# Patient Record
Sex: MALE | Race: WHITE | NOT HISPANIC OR LATINO | URBAN - METROPOLITAN AREA
[De-identification: names, ages, dates, MRNs, and addresses within clinical notes are randomized per-mention and may not be internally consistent; named-entity substitution may affect disease eponyms.]

---

## 2021-09-07 ENCOUNTER — EMERGENCY (EMERGENCY)
Facility: HOSPITAL | Age: 46
LOS: 0 days | Discharge: HOME | End: 2021-09-07
Attending: EMERGENCY MEDICINE | Admitting: EMERGENCY MEDICINE
Payer: COMMERCIAL

## 2021-09-07 VITALS
DIASTOLIC BLOOD PRESSURE: 86 MMHG | OXYGEN SATURATION: 99 % | RESPIRATION RATE: 18 BRPM | SYSTOLIC BLOOD PRESSURE: 130 MMHG | TEMPERATURE: 97 F | HEIGHT: 69 IN | HEART RATE: 55 BPM | WEIGHT: 214.95 LBS

## 2021-09-07 VITALS
HEART RATE: 72 BPM | OXYGEN SATURATION: 99 % | SYSTOLIC BLOOD PRESSURE: 133 MMHG | TEMPERATURE: 97 F | DIASTOLIC BLOOD PRESSURE: 76 MMHG | RESPIRATION RATE: 18 BRPM

## 2021-09-07 DIAGNOSIS — M79.662 PAIN IN LEFT LOWER LEG: ICD-10-CM

## 2021-09-07 DIAGNOSIS — R26.2 DIFFICULTY IN WALKING, NOT ELSEWHERE CLASSIFIED: ICD-10-CM
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
## 2021-09-07 DIAGNOSIS — S86.912A STRAIN OF UNSPECIFIED MUSCLE(S) AND TENDON(S) AT LOWER LEG LEVEL, LEFT LEG, INITIAL ENCOUNTER: ICD-10-CM
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
## 2021-09-07 PROCEDURE — 99283 EMERGENCY DEPT VISIT LOW MDM: CPT

## 2021-09-07 NOTE — ED PROVIDER NOTE - NS ED ROS FT
ROS:     constitutional: no fever, weight loss  msk: left calf pain   skin: no laceration or swelling or bruising  neuro: no tingling , weakness , difficulty ambulation
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
## 2021-09-07 NOTE — ED PROVIDER NOTE - CARE PROVIDER_API CALL
Jeffy Whaley)  Orthopaedic Surgery  [address] [place] [address]  Phone: [phone]  Fax: [phone]  Follow Up Time:

## 2021-09-07 NOTE — ED PROVIDER NOTE - PATIENT PORTAL LINK FT
You can access the FollowMyHealth Patient Portal offered by [facility] by registering at the following website: [email or web address]. By joining [facility]’s FollowMyHealth portal, you will also be able to view your health information using other applications (apps) compatible with our system.

## 2021-09-07 NOTE — ED PROVIDER NOTE - PHYSICAL EXAMINATION
Vital Signs: I have reviewed the initial vital signs.  Constitutional: well-nourished, no acute distress  Musculoskeletal: agudelo test in tact left leg, good ROM of extremity at ankle and knee,  no bony tenderness, no deformity, good peripheral pulses, +ttp medial calf without any bruising   Integumentary: (-) laceration, (-) ecchymosis (-) swelling   Neurologic: awake, alert, extremities’ motor and sensory functions grossly intact, no focal deficits  heme: (-) no adenopathy (-)lymphangitis

## 2021-09-07 NOTE — ED PROVIDER NOTE - CLINICAL SUMMARY MEDICAL DECISION MAKING FREE TEXT BOX
pt presenting with L calf pain sp injury/popping sensation- no numbness/focal weakness, no other acute injuries or complaints. +L calf point tenderness mid gastroc, no ecchymoses. no bony ttp. 2+ equal pulses throughout <2sec capillary refill throughout. NVI. Comfortable with discharge and follow-up outpatient, strict return precautions given. Endorses understanding of all of this and aware that they can return at any time for new or concerning symptoms. No further questions or concerns at this time

## 2022-10-19 NOTE — ED PROVIDER NOTE - OBJECTIVE STATEMENT
Regarding: [place]-Cough, Trouble Breathing, Chest Pain, Wheezing  ----- Message from Evon Varner sent at 10/19/2022  9:47 AM CDT -----  Patient Name: Nell Sher    Specialist or PCP Name: Dr. Xavier Andrade    Symptoms: Cough, trouble breathing, chest pain, wheezing-Per patient has history of asthma.    Pregnant (females aged 13-60. If Yes, how long?) : N/A    Call Back # : [phone]    Which State are you currently located in?: [place]    Name of Clinic Site / Acct# : [id number]    Use following scripting for patients waiting for a callback:   “Nurse callback times vary based on call volumes; please be aware the return phone call may come from an unidentified phone number. If your symptoms worsen or become life threatening while waiting, you should seek immediate assistance by calling 911 or going to the ER for evaluation.”     46 y/o male presents to the ED with left calf pain s/p pop when ambulating. patient felt similar symptoms 2 weeks ago with spontaneous resolution. patient c/o pain with ambulating. no tingling to toes. no ankle, knee or hip pain. 46 y/o male presents to the ED with left calf pain s/p pop when ambulating today. patient felt similar symptoms 2 weeks ago with spontaneous resolution. patient c/o pain with ambulating. no tingling to toes. no ankle, knee or hip pain. c/o throbbing pain with swelling. no bruising or deformity.

## 2025-05-09 NOTE — ED PROVIDER NOTE - NS ED ATTENDING STATEMENT MOD
I have personally performed a face to face diagnostic evaluation on this patient. I have reviewed the ACP note and agree with the history, exam and plan of care, except as noted.
Bill For Surgical Tray: no
Billing Type: Third-Party Bill
Performing Laboratory: 0
Expected Date Of Service: 05/09/2025